# Patient Record
Sex: MALE | Race: WHITE | HISPANIC OR LATINO | URBAN - METROPOLITAN AREA
[De-identification: names, ages, dates, MRNs, and addresses within clinical notes are randomized per-mention and may not be internally consistent; named-entity substitution may affect disease eponyms.]

---

## 2024-02-07 ENCOUNTER — HOSPITAL ENCOUNTER (EMERGENCY)
Facility: HOSPITAL | Age: 34
Discharge: ANOTHER HOSPITAL | End: 2024-02-07
Attending: EMERGENCY MEDICINE

## 2024-02-07 VITALS
OXYGEN SATURATION: 98 % | HEART RATE: 65 BPM | RESPIRATION RATE: 20 BRPM | TEMPERATURE: 99 F | WEIGHT: 185 LBS | BODY MASS INDEX: 29.03 KG/M2 | DIASTOLIC BLOOD PRESSURE: 85 MMHG | HEIGHT: 67 IN | SYSTOLIC BLOOD PRESSURE: 155 MMHG

## 2024-02-07 DIAGNOSIS — S99.922A FOOT INJURY, LEFT, INITIAL ENCOUNTER: ICD-10-CM

## 2024-02-07 LAB
ALBUMIN SERPL BCP-MCNC: 4.4 G/DL (ref 3.5–5.2)
ALP SERPL-CCNC: 68 U/L (ref 55–135)
ALT SERPL W/O P-5'-P-CCNC: 22 U/L (ref 10–44)
ANION GAP SERPL CALC-SCNC: 10 MMOL/L (ref 8–16)
APTT PPP: 29.2 SEC (ref 21–32)
AST SERPL-CCNC: 17 U/L (ref 10–40)
BASOPHILS # BLD AUTO: 0.06 K/UL (ref 0–0.2)
BASOPHILS NFR BLD: 0.6 % (ref 0–1.9)
BILIRUB SERPL-MCNC: 0.2 MG/DL (ref 0.1–1)
BUN SERPL-MCNC: 11 MG/DL (ref 6–20)
CALCIUM SERPL-MCNC: 9.7 MG/DL (ref 8.7–10.5)
CHLORIDE SERPL-SCNC: 105 MMOL/L (ref 95–110)
CO2 SERPL-SCNC: 26 MMOL/L (ref 23–29)
CREAT SERPL-MCNC: 0.8 MG/DL (ref 0.5–1.4)
CRP SERPL-MCNC: 2.8 MG/L (ref 0–8.2)
DIFFERENTIAL METHOD BLD: ABNORMAL
EOSINOPHIL # BLD AUTO: 0.3 K/UL (ref 0–0.5)
EOSINOPHIL NFR BLD: 3.2 % (ref 0–8)
ERYTHROCYTE [DISTWIDTH] IN BLOOD BY AUTOMATED COUNT: 12.1 % (ref 11.5–14.5)
ERYTHROCYTE [SEDIMENTATION RATE] IN BLOOD BY WESTERGREN METHOD: 6 MM/HR (ref 0–10)
EST. GFR  (NO RACE VARIABLE): >60 ML/MIN/1.73 M^2
GLUCOSE SERPL-MCNC: 89 MG/DL (ref 70–110)
HCT VFR BLD AUTO: 44.2 % (ref 40–54)
HGB BLD-MCNC: 15.1 G/DL (ref 14–18)
IMM GRANULOCYTES # BLD AUTO: 0.02 K/UL (ref 0–0.04)
IMM GRANULOCYTES NFR BLD AUTO: 0.2 % (ref 0–0.5)
INR PPP: 0.9 (ref 0.8–1.2)
LACTATE SERPL-SCNC: 1.1 MMOL/L (ref 0.5–2.2)
LYMPHOCYTES # BLD AUTO: 2.7 K/UL (ref 1–4.8)
LYMPHOCYTES NFR BLD: 26.6 % (ref 18–48)
MCH RBC QN AUTO: 31.7 PG (ref 27–31)
MCHC RBC AUTO-ENTMCNC: 34.2 G/DL (ref 32–36)
MCV RBC AUTO: 93 FL (ref 82–98)
MONOCYTES # BLD AUTO: 0.6 K/UL (ref 0.3–1)
MONOCYTES NFR BLD: 5.7 % (ref 4–15)
NEUTROPHILS # BLD AUTO: 6.4 K/UL (ref 1.8–7.7)
NEUTROPHILS NFR BLD: 63.7 % (ref 38–73)
NRBC BLD-RTO: 0 /100 WBC
PLATELET # BLD AUTO: 315 K/UL (ref 150–450)
PMV BLD AUTO: 10.7 FL (ref 9.2–12.9)
POTASSIUM SERPL-SCNC: 4 MMOL/L (ref 3.5–5.1)
PROT SERPL-MCNC: 8.3 G/DL (ref 6–8.4)
PROTHROMBIN TIME: 10.3 SEC (ref 9–12.5)
RBC # BLD AUTO: 4.77 M/UL (ref 4.6–6.2)
SODIUM SERPL-SCNC: 141 MMOL/L (ref 136–145)
WBC # BLD AUTO: 10.02 K/UL (ref 3.9–12.7)

## 2024-02-07 PROCEDURE — 25000003 PHARM REV CODE 250

## 2024-02-07 PROCEDURE — 83605 ASSAY OF LACTIC ACID: CPT

## 2024-02-07 PROCEDURE — 80053 COMPREHEN METABOLIC PANEL: CPT

## 2024-02-07 PROCEDURE — 86140 C-REACTIVE PROTEIN: CPT

## 2024-02-07 PROCEDURE — 85730 THROMBOPLASTIN TIME PARTIAL: CPT

## 2024-02-07 PROCEDURE — 63600175 PHARM REV CODE 636 W HCPCS

## 2024-02-07 PROCEDURE — 96365 THER/PROPH/DIAG IV INF INIT: CPT

## 2024-02-07 PROCEDURE — 85610 PROTHROMBIN TIME: CPT

## 2024-02-07 PROCEDURE — 85652 RBC SED RATE AUTOMATED: CPT

## 2024-02-07 PROCEDURE — 85025 COMPLETE CBC W/AUTO DIFF WBC: CPT

## 2024-02-07 PROCEDURE — 99284 EMERGENCY DEPT VISIT MOD MDM: CPT | Mod: 25

## 2024-02-07 RX ADMIN — CEFTRIAXONE SODIUM 2 G: 2 INJECTION, POWDER, FOR SOLUTION INTRAMUSCULAR; INTRAVENOUS at 04:02

## 2024-02-07 NOTE — ED PROVIDER NOTES
Encounter Date: 2/7/2024       History     Chief Complaint   Patient presents with    Wound Check     Pt with wound to left foot. Coffee fell on left foot on Friday. No drainage noted. Redness noted.      32 y/o male with no PMH  presents for emergent evaluation of wound to left foot. He states that he had a hole in his boot and that wet concrete seeped through hole on to left dorsal aspect of foot 12 days prior at work.  He states this occurred around 10:00 a.m. and noticed some burning but  when he took his boot off around 1:00 p.m. noticed the open wound.  He states that pain has been 6/10  but not worsening.  He presents to ED because he noticed some increased redness and swelling to the foot.  He has been using topical Neosporin and other medications for the pain.  He is able to move toes without difficulty.  He denies any decrease in sensation or strength.  Denies any secondary injury.  Denies history of diabetes or poor healing wounds in the past. This is this is 1st time being evaluated for this.Patient denies fever, chills, nausea, vomiting, diarrhea, urinary complaints, abdominal pain, neck stiffness, or any other complaints at this time.       The history is provided by the patient. The history is limited by a language barrier. A  was used (#[id number]).     Review of patient's allergies indicates:  No Known Allergies  No past medical history on file.  No past surgical history on file.  No family history on file.     Review of Systems   Constitutional:  Negative for chills and fever.   HENT:  Negative for congestion, rhinorrhea and sore throat.    Respiratory:  Negative for shortness of breath and wheezing.    Cardiovascular:  Negative for chest pain.   Gastrointestinal:  Negative for abdominal pain, constipation, diarrhea, nausea and vomiting.   Genitourinary:  Negative for decreased urine volume and difficulty urinating.   Musculoskeletal:  Positive for joint swelling. Negative for  myalgias.   Skin:  Positive for color change and wound. Negative for rash.   Neurological:  Negative for syncope, weakness, numbness and headaches.       Physical Exam     Initial Vitals [02/07/24 1412]   BP Pulse Resp Temp SpO2   (!) 160/83 66 18 99 °F (37.2 °C) 97 %      MAP       --         Physical Exam    Nursing note and vitals reviewed.  Constitutional: He appears well-developed and well-nourished. He is not diaphoretic. No distress.   HENT:   Head: Normocephalic.   Right Ear: External ear normal.   Left Ear: External ear normal.   Eyes: Conjunctivae and EOM are normal. No scleral icterus.   Neck: Neck supple. No tracheal deviation present. No JVD present.   Normal range of motion.  Pulmonary/Chest: No respiratory distress.   Abdominal: Abdomen is soft. Bowel sounds are normal. He exhibits no distension. There is no abdominal tenderness. There is no rebound.   Musculoskeletal:         General: Normal range of motion.      Cervical back: Normal range of motion and neck supple.     Neurological: He is alert and oriented to person, place, and time. He has normal strength.   Skin: Skin is warm and dry. Capillary refill takes less than 2 seconds. No rash noted. There is erythema.   SEE IMAGE    3.5 cm 3rd degree burn to dorsal aspect of left foot.  No cap refill wound area. No pain or discomfort with palpation of burn.  There is surrounding erythema, edema and increased warmth.  Tenderness to surrounding cellulitic skin.  Patient is able to move toes and foot without any decreased range of motion.  Ambulating although notable limp due to pain.   Psychiatric: He has a normal mood and affect. Thought content normal.           ED Course   Procedures  Labs Reviewed   CBC W/ AUTO DIFFERENTIAL - Abnormal; Notable for the following components:       Result Value    MCH 31.7 (*)     All other components within normal limits   COMPREHENSIVE METABOLIC PANEL   C-REACTIVE PROTEIN   SEDIMENTATION RATE   LACTIC ACID, PLASMA    PROTIME-INR   APTT          Imaging Results              X-Ray Foot Complete Left (Final result)  Result time 02/07/24 14:36:19      Final result by Vazquez Monroe MD (02/07/24 14:36:19)                   Impression:      As above.      Electronically signed by: Vazquez Monroe  Date:    02/07/2024  Time:    14:36               Narrative:    EXAMINATION:  XR FOOT COMPLETE 3 VIEW LEFT    CLINICAL HISTORY:  .  Unspecified injury of left foot, initial encounter    TECHNIQUE:  AP, lateral and oblique views of the left foot were performed.    COMPARISON:  None    FINDINGS:  No fracture or dislocation.  Joint spaces are preserved.  There is soft tissue swelling in the forefoot.  No radiopaque foreign body.                                       Medications   cefTRIAXone (ROCEPHIN) 2 g in dextrose 5 % in water (D5W) 100 mL IVPB (MB+) (0 g Intravenous Stopped 2/7/24 1719)     Medical Decision Making  34 y/o male with no PMH presents for wound check.  He reports burn to dorsal aspect of the left foot X 12 days prior.  Ambulating with limp.  Has used topical Neosporin and no other medication.  Reports 6/10 pain to foot.  No sensation to wound.  On exam, 3 cm 3rd degree burn to dorsal aspect of the left foot.  Notable surrounding cellulitis.  2+ pedal pulse.     X-ray with no bony abnormalities.  Soft tissue swelling to the forefoot noted.  CBC without leukocytosis.  Hemoglobin and hematocrit stable. CMP unremarkable without significant electrolyte derangement, impaired renal function, or elevated LFTs.  No elevations in CRP, sed rate or lactic. PT, INR, PTT wnl.  Started on IV fluids and given 2 g of Rocephin.  Patient has no insurance nor does he have an established primary care physician.  I am concerned that patient would not follow up outpatient with increased risk to ischemia due to burn, cellulitis, sepsis  and believe transfer to burn center would be most appropriate for patient's care and safety.  Transfer to [facility] Burn  Center initiated.    Patient updated on all labs and imaging and is agreeable to transfer for higher level care.  After review of the patient's physical exam, ED testing, and history/symptoms, the patient requires additional care in the hospital overnight.  Has been accepted to [facility] burn center for higher level of care.  Patient is agreeable to transfer.  He has no questions at this time.  He is stable at this time.      Amount and/or Complexity of Data Reviewed  Labs: ordered.  Radiology: ordered. Decision-making details documented in ED Course.  Discussion of management or test interpretation with external provider(s): I discussed this patient with Dr. Acosta, ED supervising physician who personally evaluated this patient and is agreeable to my assessment and plan.    Risk  Decision regarding hospitalization.  Diagnosis or treatment significantly limited by social determinants of health.               ED Course as of 02/07/24 1903 Wed Feb 07, 2024   1522 X-Ray Foot Complete Left  No fracture or dislocation.  Joint spaces are preserved.  There is soft tissue swelling in the forefoot.  No radiopaque foreign body. [CC]   1736 I discussed transfer with patient who has been accepted at [facility] ED for evaluation by burn team.  He is agreeable to transfer.  He has been updated on all assessment and plan has no other questions at this time. [CC]      ED Course User Index  [CC] Yoselin Flores PA-C                           Clinical Impression:  Final diagnoses:  [S99.922A] Foot injury, left, initial encounter          ED Disposition Condition    Transfer to Another Facility Stable                Yoselin Flores PA-C  02/07/24 1903

## 2024-02-07 NOTE — ED TRIAGE NOTES
Pt presents to Ed with complaint of burn to left foot. Pt states he dropped coffee on left foot. Pt can not feel on left foot. Pt came in due to pain.